# Patient Record
Sex: MALE | URBAN - METROPOLITAN AREA
[De-identification: names, ages, dates, MRNs, and addresses within clinical notes are randomized per-mention and may not be internally consistent; named-entity substitution may affect disease eponyms.]

---

## 2021-01-05 ENCOUNTER — OFFICE VISIT (OUTPATIENT)
Dept: URBAN - METROPOLITAN AREA CLINIC 71 | Facility: CLINIC | Age: 71
End: 2021-01-05
Payer: MEDICARE

## 2021-01-05 DIAGNOSIS — H40.1133 PRIMARY OPEN-ANGLE GLAUCOMA, BILATERAL, SEVERE STAGE: Primary | ICD-10-CM

## 2021-01-05 DIAGNOSIS — H35.361 DRUSEN (DEGENERATIVE) OF MACULA, RIGHT EYE: ICD-10-CM

## 2021-01-05 DIAGNOSIS — H35.372 PUCKERING OF MACULA, LEFT EYE: ICD-10-CM

## 2021-01-05 DIAGNOSIS — H27.00 APHAKIA: ICD-10-CM

## 2021-01-05 DIAGNOSIS — H33.8 OTHER RETINAL DETACHMENTS: ICD-10-CM

## 2021-01-05 DIAGNOSIS — H35.81 RETINAL EDEMA: ICD-10-CM

## 2021-01-05 PROCEDURE — 92004 COMPRE OPH EXAM NEW PT 1/>: CPT | Performed by: OPHTHALMOLOGY

## 2021-01-05 PROCEDURE — 92133 CPTRZD OPH DX IMG PST SGM ON: CPT | Performed by: OPHTHALMOLOGY

## 2021-01-05 PROCEDURE — 92134 CPTRZ OPH DX IMG PST SGM RTA: CPT | Performed by: OPHTHALMOLOGY

## 2021-01-05 ASSESSMENT — INTRAOCULAR PRESSURE
OS: 18
OD: 15

## 2021-01-05 NOTE — IMPRESSION/PLAN
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 Left. Plan: Will continue to observe. Recommend seeing optometrist to try to improve vision.  Pt needs [id number] [facility] drivers license, need to try to get to 20/40 or better and fill out DMV form

## 2021-01-05 NOTE — IMPRESSION/PLAN
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 History of retinal tears OU. Scleral jeri OS, buckle removed OD Plan:  Will continue to observe

## 2021-01-05 NOTE — IMPRESSION/PLAN
Impression: Primary open-angle glaucoma, bilateral, severe stage: J67.7738. Plan: Discussed diagnosis in detail with patient. Will continue to observe condition and or symptoms. Will continue to monitor IOP.  Continue timolol QAM OU

## 2021-01-05 NOTE — IMPRESSION/PLAN
Impression: Retinal edema: H35.81. History of CME OD. Has been taking Pred Plan: Continue Pred QD OD.  Will continue to observe

## 2021-03-29 ENCOUNTER — OFFICE VISIT (OUTPATIENT)
Dept: URBAN - METROPOLITAN AREA CLINIC 71 | Facility: CLINIC | Age: 71
End: 2021-03-29
Payer: MEDICARE

## 2021-03-29 DIAGNOSIS — H52.4 PRESBYOPIA: ICD-10-CM

## 2021-03-29 PROCEDURE — 99204 OFFICE O/P NEW MOD 45 MIN: CPT | Performed by: OPTOMETRIST

## 2021-03-29 PROCEDURE — 92310 CONTACT LENS FITTING OU: CPT | Performed by: OPTOMETRIST

## 2021-03-29 ASSESSMENT — INTRAOCULAR PRESSURE
OS: 18
OD: 18

## 2021-03-29 ASSESSMENT — KERATOMETRY
OS: 43.25
OD: 51.75

## 2021-03-29 NOTE — IMPRESSION/PLAN
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 History of retinal tears OU.  Scleral jeri OS, buckle removed OD Plan:  observe

## 2021-03-29 NOTE — IMPRESSION/PLAN
Impression: Presbyopia: H52.4- Discussed possible Scleral lens Plan: Updated glasses and contact prescription dispensed to patient. Hybrid Lens OD: adequate translation, base curve could be flatten to decrease air bubbles by 1 diopter. Synergeyes BC: 7.7, SC: 8.7, power +10.50 marta: 14.5 Over Refraction OD : Day driving only.  -0.50 20/50+1

## 2021-03-29 NOTE — IMPRESSION/PLAN
Impression: Primary open-angle glaucoma, bilateral, severe stage: N76.5064. Plan: Discussed diagnosis in detail with patient. Will continue to observe condition and or symptoms. Will continue to monitor IOP.  Continue timolol QAM OU

## 2021-05-03 ENCOUNTER — OFFICE VISIT (OUTPATIENT)
Dept: URBAN - METROPOLITAN AREA CLINIC 75 | Facility: CLINIC | Age: 71
End: 2021-05-03

## 2021-05-03 PROCEDURE — 92310 CONTACT LENS FITTING OU: CPT | Performed by: OPTOMETRIST

## 2021-05-03 NOTE — IMPRESSION/PLAN
Impression: Presbyopia: H52.4-Synergeyes BC: 7.7, SC: 8.7, power +10.50 marta: 14.5 Plan: Updated glasses and contact prescription dispensed to patient. Hybrid Lens OD: adequate translation,  Adequate movemen VA OD : 20/70

## 2021-05-04 ENCOUNTER — OFFICE VISIT (OUTPATIENT)
Dept: URBAN - METROPOLITAN AREA CLINIC 71 | Facility: CLINIC | Age: 71
End: 2021-05-04
Payer: MEDICARE

## 2021-05-04 PROCEDURE — 92083 EXTENDED VISUAL FIELD XM: CPT | Performed by: OPHTHALMOLOGY

## 2021-05-04 PROCEDURE — 99213 OFFICE O/P EST LOW 20 MIN: CPT | Performed by: OPHTHALMOLOGY

## 2021-05-04 NOTE — IMPRESSION/PLAN
Impression: Primary open-angle glaucoma, bilateral, severe stage: V94.5252. Stable. IOPs doing well. No progression on VF performed today compared to VF reviewed from previous doctor in 2011. Plan: Discussed. No change to treatment at this time, continue timolol QAM OU Will continue to monitor.  Contact office with any changes in vision

## 2021-12-21 ENCOUNTER — OFFICE VISIT (OUTPATIENT)
Dept: URBAN - METROPOLITAN AREA CLINIC 71 | Facility: CLINIC | Age: 71
End: 2021-12-21
Payer: MEDICARE

## 2021-12-21 DIAGNOSIS — Z96.1 PRESENCE OF INTRAOCULAR LENS: ICD-10-CM

## 2021-12-21 DIAGNOSIS — H35.373 PUCKERING OF MACULA, BILATERAL: ICD-10-CM

## 2021-12-21 PROCEDURE — 92133 CPTRZD OPH DX IMG PST SGM ON: CPT | Performed by: OPHTHALMOLOGY

## 2021-12-21 PROCEDURE — 92014 COMPRE OPH EXAM EST PT 1/>: CPT | Performed by: OPHTHALMOLOGY

## 2021-12-21 PROCEDURE — 92134 CPTRZ OPH DX IMG PST SGM RTA: CPT | Performed by: OPHTHALMOLOGY

## 2021-12-21 ASSESSMENT — INTRAOCULAR PRESSURE
OD: 12
OS: 14

## 2021-12-21 NOTE — IMPRESSION/PLAN
Impression: Puckering of macula, bilateral: H35.373. Stable Plan: Discussed. No treatment needed at this time. Contact office with any changes in vision.  Continue to monitor

## 2021-12-21 NOTE — IMPRESSION/PLAN
Impression: Primary open-angle glaucoma, bilateral, severe stage: L80.1578. Stable. IOPs doing well. OCT 12/21/2021: Stable OU. Plan: Discussed. No change to treatment at this time. Continue Timolol QAM OU Will continue to monitor. Contact office with any changes in vision.  Will continue to monitor

## 2021-12-21 NOTE — IMPRESSION/PLAN
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 History of retinal tears OU.  Scleral buckle OS, buckle removed OD Plan: Continue to monitor

## 2022-01-14 ENCOUNTER — OFFICE VISIT (OUTPATIENT)
Dept: URBAN - METROPOLITAN AREA CLINIC 75 | Facility: CLINIC | Age: 72
End: 2022-01-14

## 2022-01-14 PROCEDURE — 92310 CONTACT LENS FITTING OU: CPT | Performed by: OPTOMETRIST

## 2022-01-14 NOTE — IMPRESSION/PLAN
Impression: Presbyopia: H52.4-Synergeyes  Plan: OD: the edge has a crease, which is causing vision to be unstable and lens to move on the eye. VA: 20/60-1 Pt needs a +10.50 in RGP Pt would like to order a trial soft lens for OD only. Power would be a +11.00 in a biofinity  or B& L Ultra. 

*Will order in a +10.50 RGP to replace due to the other lens being defective

## 2022-01-20 ENCOUNTER — OFFICE VISIT (OUTPATIENT)
Dept: URBAN - METROPOLITAN AREA CLINIC 75 | Facility: CLINIC | Age: 72
End: 2022-01-20
Payer: MEDICARE

## 2022-01-20 PROCEDURE — 92310 CONTACT LENS FITTING OU: CPT | Performed by: OPTOMETRIST

## 2022-03-21 ENCOUNTER — OFFICE VISIT (OUTPATIENT)
Dept: URBAN - METROPOLITAN AREA CLINIC 75 | Facility: CLINIC | Age: 72
End: 2022-03-21
Payer: COMMERCIAL

## 2022-03-21 PROCEDURE — 92310 CONTACT LENS FITTING OU: CPT | Performed by: OPTOMETRIST

## 2022-03-21 PROCEDURE — 92014 COMPRE OPH EXAM EST PT 1/>: CPT | Performed by: OPTOMETRIST

## 2022-03-21 ASSESSMENT — VISUAL ACUITY: OD: 20/150

## 2022-03-21 ASSESSMENT — INTRAOCULAR PRESSURE
OD: 12
OS: 16

## 2022-03-21 ASSESSMENT — KERATOMETRY
OS: 43.13
OD: 43.25

## 2022-03-21 NOTE — IMPRESSION/PLAN
Impression: Presbyopia: H52.4. OD 
12.2 HVID Plan: Educated on lens and fit. Pt to return for ctl distribution.

## 2022-03-21 NOTE — IMPRESSION/PLAN
Impression: Presbyopia: H52.4. Plan: New glasses & CTL Rx was generated today. Pt to contact office with any questions or concerns. PRN Return 1yr Full Vision

## 2022-04-12 ENCOUNTER — OFFICE VISIT (OUTPATIENT)
Dept: URBAN - METROPOLITAN AREA CLINIC 75 | Facility: CLINIC | Age: 72
End: 2022-04-12

## 2022-04-12 DIAGNOSIS — H52.4 PRESBYOPIA: Primary | ICD-10-CM

## 2022-04-12 PROCEDURE — 92310 CONTACT LENS FITTING OU: CPT | Performed by: OPTOMETRIST

## 2022-04-12 ASSESSMENT — INTRAOCULAR PRESSURE: OS: 13

## 2022-04-12 NOTE — IMPRESSION/PLAN
Impression: Presbyopia: H52.4. OD Good Movement. OR  -0.50 Fit:  Apical Pooling - apically flatter - good 1mm movement on blink Plan: Lens dispensed and applied in clinic today. New Lens to be finalized and created, pt to be called for dispense. Educated pt on lens, insertion with some clt lens fluid to minimize air bubble possibility,  removal, and care.

## 2022-04-20 ENCOUNTER — OFFICE VISIT (OUTPATIENT)
Dept: URBAN - METROPOLITAN AREA CLINIC 75 | Facility: CLINIC | Age: 72
End: 2022-04-20

## 2022-04-20 DIAGNOSIS — H52.4 PRESBYOPIA: Primary | ICD-10-CM

## 2022-04-20 PROCEDURE — 92310 CONTACT LENS FITTING OU: CPT | Performed by: OPTOMETRIST

## 2022-04-20 ASSESSMENT — INTRAOCULAR PRESSURE: OS: 13

## 2022-04-20 NOTE — IMPRESSION/PLAN
Impression: Presbyopia: H52.4 Right. New Lens applied in clinic like a scleral lens with lens filled with liquid Plan: Discussed educating pt on new application for new lens. Pt defers. New Synergy contacts to be discarded. Pt has requested two identical versions of the old Synergies lens be ordered and dispensed. Will reconsider scleral lens. Scleral lens consult to be made, pt educated on lens and fees. No dispense appt needed when lens come in, scleral lens fit ti be done at that time.

## 2022-05-06 ENCOUNTER — OFFICE VISIT (OUTPATIENT)
Dept: URBAN - METROPOLITAN AREA CLINIC 75 | Facility: CLINIC | Age: 72
End: 2022-05-06
Payer: MEDICARE

## 2022-05-06 DIAGNOSIS — H52.4 PRESBYOPIA: Primary | ICD-10-CM

## 2022-05-06 PROCEDURE — 92310 CONTACT LENS FITTING OU: CPT | Performed by: OPTOMETRIST

## 2022-05-06 NOTE — IMPRESSION/PLAN
Impression: Presbyopia: H52.4. OD SAG 4400 PWR: -1.50 JONES: 15.6  MID&SHAHID: STD  EDG:+75/-75  CT: 0.24 OR : +10.25  20/50+ Fit: Toric 8 & 2 - haptic meridian blanching temp - Sup Sag Plan: Educated pt on application, removal, and care. Care pamphlet dispensed Store in dry case when storing for long periods. Allow contact to re-soak for 1-2 days before reapplying. Will contact pt for dispense appt when lens arrives.

## 2022-05-25 ENCOUNTER — OFFICE VISIT (OUTPATIENT)
Dept: URBAN - METROPOLITAN AREA CLINIC 75 | Facility: CLINIC | Age: 72
End: 2022-05-25

## 2022-05-25 PROCEDURE — 92310 CONTACT LENS FITTING OU: CPT | Performed by: OPTOMETRIST

## 2022-05-25 NOTE — IMPRESSION/PLAN
Impression: Presbyopia: H52.4 Right. Good Fit OR: +1.25  20/40-2 Plan: Discussed new lens with corrected power & no fit changes will be ordered. Dispense appt needed.

## 2022-06-16 ENCOUNTER — OFFICE VISIT (OUTPATIENT)
Dept: URBAN - METROPOLITAN AREA CLINIC 75 | Facility: CLINIC | Age: 72
End: 2022-06-16

## 2022-06-16 DIAGNOSIS — H27.00 APHAKIA: Primary | ICD-10-CM

## 2022-06-16 DIAGNOSIS — H52.4 PRESBYOPIA: ICD-10-CM

## 2022-06-16 PROCEDURE — V2799 MISC VISION ITEM OR SERVICE: HCPCS | Performed by: OPTOMETRIST

## 2022-06-16 PROCEDURE — 92310 CONTACT LENS FITTING OU: CPT | Performed by: OPTOMETRIST

## 2022-07-08 ENCOUNTER — OFFICE VISIT (OUTPATIENT)
Dept: URBAN - METROPOLITAN AREA CLINIC 71 | Facility: CLINIC | Age: 72
End: 2022-07-08
Payer: MEDICARE

## 2022-07-08 DIAGNOSIS — H40.1133 PRIMARY OPEN-ANGLE GLAUCOMA, BILATERAL, SEVERE STAGE: Primary | ICD-10-CM

## 2022-07-08 DIAGNOSIS — Z96.1 PRESENCE OF INTRAOCULAR LENS: ICD-10-CM

## 2022-07-08 DIAGNOSIS — H33.8 OTHER RETINAL DETACHMENTS: ICD-10-CM

## 2022-07-08 DIAGNOSIS — H27.00 APHAKIA: ICD-10-CM

## 2022-07-08 PROCEDURE — 99212 OFFICE O/P EST SF 10 MIN: CPT

## 2022-07-08 PROCEDURE — 92083 EXTENDED VISUAL FIELD XM: CPT

## 2022-07-08 ASSESSMENT — INTRAOCULAR PRESSURE
OD: 12
OS: 14

## 2022-07-08 NOTE — IMPRESSION/PLAN
Impression: Primary open-angle glaucoma, bilateral, severe stage: S19.2243. Stable. IOPs doing well. Plan: Discussed. No change to treatment at this time. Continue Timolol QAM OU Will continue to monitor. Contact office with any changes in vision.  Will continue to monitor

## 2022-09-20 ENCOUNTER — OFFICE VISIT (OUTPATIENT)
Dept: URBAN - METROPOLITAN AREA CLINIC 75 | Facility: CLINIC | Age: 72
End: 2022-09-20

## 2022-09-20 DIAGNOSIS — H52.4 PRESBYOPIA: ICD-10-CM

## 2022-09-20 DIAGNOSIS — H27.00 APHAKIA: Primary | ICD-10-CM

## 2022-09-20 PROCEDURE — 92310 CONTACT LENS FITTING OU: CPT | Performed by: OPTOMETRIST

## 2022-09-20 PROCEDURE — V2799 MISC VISION ITEM OR SERVICE: HCPCS | Performed by: OPTOMETRIST

## 2022-09-20 NOTE — IMPRESSION/PLAN
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 Plan: PT is unable  to insert and remove lenses. Dispense lenses OD for emergency usage. Inserted scleral lens OD Full coverage

## 2023-05-22 ENCOUNTER — OFFICE VISIT (OUTPATIENT)
Dept: URBAN - METROPOLITAN AREA CLINIC 75 | Facility: CLINIC | Age: 73
End: 2023-05-22
Payer: COMMERCIAL

## 2023-05-22 DIAGNOSIS — H52.4 PRESBYOPIA: ICD-10-CM

## 2023-05-22 DIAGNOSIS — H27.00 APHAKIA: Primary | ICD-10-CM

## 2023-05-22 PROCEDURE — 92014 COMPRE OPH EXAM EST PT 1/>: CPT | Performed by: OPTOMETRIST

## 2023-05-22 PROCEDURE — 92310 CONTACT LENS FITTING OU: CPT | Performed by: OPTOMETRIST

## 2023-05-22 ASSESSMENT — INTRAOCULAR PRESSURE
OD: 14
OS: 16

## 2023-05-22 ASSESSMENT — KERATOMETRY: OS: 43.25

## 2023-05-22 ASSESSMENT — VISUAL ACUITY: OD: 20/60

## 2023-05-22 NOTE — IMPRESSION/PLAN
Impression: Presbyopia: H52.4. Plan: New glasses Rx was generated today. See Plan #1 for RGP Pt to contact office with any questions or concerns. PRN Return 1yr Full Vision

## 2023-05-22 NOTE — IMPRESSION/PLAN
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 Medically necessary Current lens has tear - Will check warranty for pt  Plan: Discussed care, application, and removal. 
New lens with no changes needed to be ordered, pt request 2 lens. Pt to be contacted when lens comes in, no dispense appt needed.

## 2024-07-03 ENCOUNTER — OFFICE VISIT (OUTPATIENT)
Dept: URBAN - METROPOLITAN AREA CLINIC 75 | Facility: CLINIC | Age: 74
End: 2024-07-03
Payer: COMMERCIAL

## 2024-07-03 DIAGNOSIS — H27.00 APHAKIA: ICD-10-CM

## 2024-07-03 DIAGNOSIS — H52.03 HYPERMETROPIA, BILATERAL: ICD-10-CM

## 2024-07-03 DIAGNOSIS — H52.01 HYPERMETROPIA, RIGHT EYE: Primary | ICD-10-CM

## 2024-07-03 PROCEDURE — 92014 COMPRE OPH EXAM EST PT 1/>: CPT | Performed by: OPTOMETRIST

## 2024-07-03 PROCEDURE — 92310 CONTACT LENS FITTING OU: CPT | Performed by: OPTOMETRIST

## 2024-07-03 ASSESSMENT — KERATOMETRY
OD: 42.75
OS: 43.13

## 2024-07-03 ASSESSMENT — INTRAOCULAR PRESSURE
OD: 10
OS: 13

## 2024-07-03 ASSESSMENT — VISUAL ACUITY
OD: 20/70
OS: CF 5FT

## 2025-03-20 ENCOUNTER — OFFICE VISIT (OUTPATIENT)
Dept: URBAN - METROPOLITAN AREA CLINIC 71 | Facility: CLINIC | Age: 75
End: 2025-03-20
Payer: MEDICARE

## 2025-03-20 DIAGNOSIS — Z96.1 PRESENCE OF INTRAOCULAR LENS: ICD-10-CM

## 2025-03-20 DIAGNOSIS — H40.1133 PRIMARY OPEN-ANGLE GLAUCOMA, BILATERAL, SEVERE STAGE: Primary | ICD-10-CM

## 2025-03-20 DIAGNOSIS — H35.361 DRUSEN (DEGENERATIVE) OF MACULA, RIGHT EYE: ICD-10-CM

## 2025-03-20 PROCEDURE — 99203 OFFICE O/P NEW LOW 30 MIN: CPT | Performed by: OPHTHALMOLOGY

## 2025-03-20 ASSESSMENT — INTRAOCULAR PRESSURE
OD: 12
OS: 12